# Patient Record
Sex: FEMALE | Race: BLACK OR AFRICAN AMERICAN | Employment: FULL TIME | URBAN - METROPOLITAN AREA
[De-identification: names, ages, dates, MRNs, and addresses within clinical notes are randomized per-mention and may not be internally consistent; named-entity substitution may affect disease eponyms.]

---

## 2017-08-25 ENCOUNTER — HOSPITAL ENCOUNTER (EMERGENCY)
Age: 27
Discharge: HOME | End: 2017-08-25
Attending: FAMILY MEDICINE

## 2017-08-25 VITALS
SYSTOLIC BLOOD PRESSURE: 139 MMHG | OXYGEN SATURATION: 97 % | HEIGHT: 66 IN | DIASTOLIC BLOOD PRESSURE: 82 MMHG | WEIGHT: 290.1 LBS | BODY MASS INDEX: 46.62 KG/M2 | HEART RATE: 79 BPM | RESPIRATION RATE: 20 BRPM | TEMPERATURE: 98.7 F

## 2017-08-25 DIAGNOSIS — L91.0 KELOID OF SKIN: ICD-10-CM

## 2017-08-25 DIAGNOSIS — L05.91 PILONIDAL CYST: Primary | ICD-10-CM

## 2017-08-25 RX ORDER — AMOXICILLIN AND CLAVULANATE POTASSIUM 875; 125 MG/1; MG/1
1 TABLET, FILM COATED ORAL EVERY 12 HOURS
Qty: 20 TAB | Refills: 0 | Status: SHIPPED | OUTPATIENT
Start: 2017-08-25 | End: 2017-09-04

## 2017-08-25 NOTE — UC PROVIDER NOTE
Patient is a 33 y.o. female presenting with skin problem. The history is provided by the patient. Skin Problem    This is a chronic problem. Episode onset: 5 months ago noticed swelling on buttocks with slight discomfort and mild bloody drainage; has hx of pilonidal cyst removal in the past. The problem has not changed since onset. The problem is associated with an unknown factor. There has been no fever. The rash is present on the back. The pain is at a severity of 2/10. The pain has been constant since onset. She has tried nothing for the symptoms. History reviewed. No pertinent past medical history. History reviewed. No pertinent surgical history. History reviewed. No pertinent family history. Social History     Social History    Marital status: SINGLE     Spouse name: N/A    Number of children: N/A    Years of education: N/A     Occupational History    Not on file. Social History Main Topics    Smoking status: Current Every Day Smoker    Smokeless tobacco: Current User    Alcohol use Not on file    Drug use: Not on file    Sexual activity: Not on file     Other Topics Concern    Not on file     Social History Narrative    No narrative on file                ALLERGIES: Review of patient's allergies indicates no known allergies. Review of Systems   Constitutional: Negative for chills and fever. Respiratory: Negative for shortness of breath and wheezing. Cardiovascular: Negative for chest pain and palpitations. Gastrointestinal: Negative for nausea and vomiting. Musculoskeletal: Negative for myalgias. Neurological: Negative for dizziness and headaches. Vitals:    08/25/17 1521   BP: 139/82   Pulse: 79   Resp: 20   Temp: 98.7 °F (37.1 °C)   SpO2: 97%   Weight: 131.6 kg (290 lb 1.6 oz)   Height: 5' 6\" (1.676 m)       Physical Exam   Constitutional: She appears well-developed and well-nourished. No distress. Neurological: She is alert.    Skin: She is not diaphoretic. Proximal crease of buttocks: 2 keloid like masses with no TTP and slight bloody drainage from proximal lesion; no fluctuance; scarring notes   Psychiatric: She has a normal mood and affect. Her behavior is normal. Judgment and thought content normal.   Nursing note and vitals reviewed. MDM     Differential Diagnosis; Clinical Impression; Plan:     CLINICAL IMPRESSION:  Pilonidal cyst  (primary encounter diagnosis)  Keloid of skin    Plan:  1. Augmentin  2. F/u with surgeon  Risk of Significant Complications, Morbidity, and/or Mortality:   Presenting problems: Moderate  Management options:   Moderate  Progress:   Patient progress:  Stable      Procedures

## 2017-08-25 NOTE — LETTER
[facility] 
[address] [place] [address] 
[phone] Work/School Note Date: 8/25/2017 To Whom It May concern: 
 
Tony Ruiz was seen and treated today in the urgent care center by the following provider(s): 
No providers found. Sincere Harrell {Return to school/sport/work:8/26/17 Sincerely, Debby Clement RN

## 2017-08-25 NOTE — DISCHARGE INSTRUCTIONS
Learning About Pilonidal Disease  What is pilonidal disease? Pilonidal (say \"kw-pfj-PY-dul\") disease is a long-term skin infection. The infection develops in a cyst at the top of or next to the crease between the buttocks. The cyst may look like a small dimple, which is called a \"pit\" or \"sinus. \" Hair may grow from the pit, and you may have several pits. What are the symptoms? · You may have no symptoms. · If the cyst is infected, you may:  ¨ Have redness or swelling in the area. ¨ Have cloudy fluid or blood draining from the cyst.  ¨ Find it hard to walk or sit because of pain. ¨ Have a fever. This is not common. How can you prevent infection? You may be able to prevent infection and symptoms. · Keep the area clean and dry. · Avoid sitting on hard surfaces for long periods of time. How is pilonidal disease treated? If you have a pilonidal cyst that is not causing symptoms, you don't need medical treatment. If a pilonidal cyst is infected:  · You will probably get antibiotics. If your doctor prescribes antibiotics, take them as directed. Do not stop taking them just because you feel better. You need to take the full course of antibiotics. · Soak in a warm tub several times a day. · Ask your doctor if you can take an over-the-counter pain medicine, such as acetaminophen (Tylenol), ibuprofen (Advil, Motrin), or naproxen (Aleve). Read and follow all instructions on the label. · You may need to have the cyst cut open and drained or removed. Follow-up care is a key part of your treatment and safety. Be sure to make and go to all appointments, and call your doctor if you are having problems. It's also a good idea to know your test results and keep a list of the medicines you take. Where can you learn more? Go to http://jenny-maría.info/. Enter C612 in the search box to learn more about \"Learning About Pilonidal Disease. \"  Current as of: October 13, 2016  Content Version: 11.3  © 2502-8270 Healthwise, Incorporated. Care instructions adapted under license by [employer] (which disclaims liability or warranty for this information). If you have questions about a medical condition or this instruction, always ask your healthcare professional. [address] any warranty or liability for your use of this information.

## 2017-08-30 ENCOUNTER — OFFICE VISIT (OUTPATIENT)
Dept: SURGERY | Age: 27
End: 2017-08-30

## 2017-08-30 VITALS
RESPIRATION RATE: 20 BRPM | WEIGHT: 291.6 LBS | DIASTOLIC BLOOD PRESSURE: 91 MMHG | SYSTOLIC BLOOD PRESSURE: 130 MMHG | HEART RATE: 82 BPM | BODY MASS INDEX: 46.86 KG/M2 | HEIGHT: 66 IN | OXYGEN SATURATION: 99 %

## 2017-08-30 DIAGNOSIS — L05.01 PILONIDAL CYST WITH ABSCESS: Primary | ICD-10-CM

## 2017-08-30 RX ORDER — LIDOCAINE HYDROCHLORIDE AND EPINEPHRINE 20; 10 MG/ML; UG/ML
10 INJECTION, SOLUTION INFILTRATION; PERINEURAL ONCE
Qty: 10 ML | Refills: 0
Start: 2017-08-30 | End: 2017-08-30

## 2017-08-30 NOTE — PROGRESS NOTES
[facility]  OFFICE PROCEDURE PROGRESS NOTE        Chart reviewed for the following:   Arleth Lebron LPN, have reviewed the History, Physical and updated the Allergic reactions for [address] performed immediately prior to start of procedure:   Arleth Lebron LPN, have performed the following reviews on [address] prior to the start of the procedure:            * Patient was identified by name and date of birth   * Agreement on procedure being performed was verified  * Risks and Benefits explained to the patient  * Procedure site verified and marked as necessary  * Patient was positioned for comfort  * Consent was signed and verified     Time: 10:45      Date of procedure: 8/30/2017    Procedure performed by:  Mario Mejía MD    Provider assisted by: Padmini Montiel LPN    Patient assisted by: self    How tolerated by patient: tolerated the procedure well with no complications    Post Procedural Pain Scale: 0 - No Hurt    Comments: none

## 2017-08-30 NOTE — LETTER
NOTIFICATION OF RETURN TO WORK 
 
8/30/2017 11:20 AM 
 
Ms. ADELA Hunter [address] [place] [address] Jess Fuentes To Whom It May Concern: 
 
ADELA Hunter is under the care of [address] starting August 30, 2017. She will be unable to return to work today but can return to work on Thursday, August 31, 2017 with no restrictions. If there are questions or concerns please have the patient contact our office. Sincerely, Mario Mejía MD

## 2017-08-30 NOTE — PROGRESS NOTES
The patient is a 33 y.o. woman who reports that when she was in high school she had surgery in the region of the prenatal cleft and thinks she may have had pilonidal cyst surgery. She reports that she has had several episodes where the site becomes painful and drains. The last was around six months ago. She then had an episode starting about four days ago. The patient reports that she is taking Augmentin prescribed for this condition. Past medical history includes asthma and sleep apnea. The patient does currently smoke. The patient occasionally uses alcohol. The patient has no history of heart disease or anginal chest pain. She has no history of diabetes. On examination in the prenatal cleft there are two areas of hypertrophic scar each about 1 cm across. There is an area which feels fluctuant immediately to the left of midline in that area. These two areas of hypertrophic scar are in the mid line. As this appeared to be an infected pilonidal cyst, I recommended incision and drainage under local anesthesia. Risks versus benefits were reviewed with the patient. The patient understood that this would consist today of incision and drainage only and the cyst once it was no longer infected, be considered for excision. Risks would include bleeding, infection, nonhealing, recurrence of infection, etc.  The patient appears to have a recurrent pilonidal cyst with infection. The patient did wish to proceed. The site was marked. Standard timeout was performed. The site was sterilely prepared and draped. Local anesthesia Xylocaine with 2% epinephrine was injected into the skin in the area to a total of about 5 cc. A longitudinal incision was made just to the left of midline at the fluctuant area and the cavity was entered which had some bloody pus. The cavity depth appeared to be between 2 - 2.5 cm and width around 2 cm.   The cavity was packed open with 1/4-inch Iodoform and covered by dry gauze. The patient will see me in follow up in 5 days. She is to change the outer dressing daily and ideally keep the packing in place. It is okay to shower. She should continue her antibiotics. I strongly urged the patient to stop smoking completely. Aids in quitting smoking were discussed.     Final Diagnosis:  Infected pilonidal cyst.    Cornel/narciso

## 2017-08-30 NOTE — MR AVS SNAPSHOT
Visit Information Date & Time Provider Department Dept. Phone Encounter #  
 8/30/2017  9:20 AM Casper Dowd MD [facility]  [address] [phone] [id number] Upcoming Health Maintenance Date Due  
 HPV AGE 9Y-34Y (1 of 3 - Female 3 Dose Series) 6/23/2002 Pneumococcal 19-64 Medium Risk (1 of 1 - PPSV23) 6/23/2010 DTaP/Tdap/Td series (1 - Tdap) 6/23/2012 PAP AKA CERVICAL CYTOLOGY 6/23/2012 INFLUENZA AGE 9 TO ADULT 8/1/2017 Allergies as of 8/30/2017  Review Complete On: 8/30/2017 By: Cecille Cabot No Known Allergies Current Immunizations  Never Reviewed No immunizations on file. Not reviewed this visit Vitals BP Pulse Resp Height(growth percentile) Weight(growth percentile) LMP  
 (!) 130/91 (BP 1 Location: Right arm, BP Patient Position: Sitting) 82 20 5' 6\" (1.676 m) 291 lb 9.6 oz (132.3 kg) 08/20/2017 SpO2 BMI OB Status Smoking Status 99% 47.07 kg/m2 Having regular periods Current Every Day Smoker BMI and BSA Data Body Mass Index Body Surface Area 47.07 kg/m 2 2.48 m 2 Your Updated Medication List  
  
   
This list is accurate as of: 8/30/17 11:07 AM.  Always use your most recent med list.  
  
  
  
  
 amoxicillin-clavulanate 875-125 mg per tablet Commonly known as:  AUGMENTIN Take 1 Tab by mouth every twelve (12) hours for 10 days. Introducing [facility] & HEALTH SERVICES! Lanette Morales introduces [employer] patient portal. Now you can access parts of your medical record, email your doctor's office, and request medication refills online. 1. In your internet browser, go to [email or web address] 2. Click on the First Time User? Click Here link in the Sign In box. You will see the New Member Sign Up page. 3. Enter your [employer]Joy Access Code exactly as it appears below. You will not need to use this code after youve completed the sign-up process.  If you do not sign up before the expiration date, you must request a new code. · LAVEGO Access Code: [email or web address] Expires: 11/23/2017  3:36 PM 
 
4. Enter the last four digits of your Social Security Number (xxxx) and Date of Birth (mm/dd/yyyy) as indicated and click Submit. You will be taken to the next sign-up page. 5. Create a LAVEGO ID. This will be your LAVEGO login ID and cannot be changed, so think of one that is secure and easy to remember. 6. Create a LAVEGO password. You can change your password at any time. 7. Enter your Password Reset Question and Answer. This can be used at a later time if you forget your password. 8. Enter your e-mail address. You will receive e-mail notification when new information is available in [address] 9. Click Sign Up. You can now view and download portions of your medical record. 10. Click the Download Summary menu link to download a portable copy of your medical information. If you have questions, please visit the Frequently Asked Questions section of the LAVEGO website. Remember, LAVEGO is NOT to be used for urgent needs. For medical emergencies, dial 911. Now available from your iPhone and Android! Please provide this summary of care documentation to your next provider. Your primary care clinician is listed as NONE. If you have any questions after today's visit, please call [phone].

## 2017-08-31 PROBLEM — L05.01 PILONIDAL CYST WITH ABSCESS: Status: ACTIVE | Noted: 2017-08-31

## 2017-09-07 ENCOUNTER — OFFICE VISIT (OUTPATIENT)
Dept: SURGERY | Age: 27
End: 2017-09-07

## 2017-09-07 VITALS
HEIGHT: 66 IN | OXYGEN SATURATION: 98 % | DIASTOLIC BLOOD PRESSURE: 77 MMHG | SYSTOLIC BLOOD PRESSURE: 112 MMHG | BODY MASS INDEX: 46.93 KG/M2 | HEART RATE: 92 BPM | WEIGHT: 292 LBS

## 2017-09-07 DIAGNOSIS — Z09 POSTOPERATIVE EXAMINATION: Primary | ICD-10-CM

## 2017-09-07 NOTE — MR AVS SNAPSHOT
Visit Information Date & Time Provider Department Dept. Phone Encounter #  
 9/7/2017 10:40 AM Candice Castillo MD [facility] [phone] [id number] Upcoming Health Maintenance Date Due  
 HPV AGE 9Y-34Y (1 of 3 - Female 3 Dose Series) 6/23/2002 Pneumococcal 19-64 Medium Risk (1 of 1 - PPSV23) 6/23/2010 DTaP/Tdap/Td series (1 - Tdap) 6/23/2012 PAP AKA CERVICAL CYTOLOGY 6/23/2012 INFLUENZA AGE 9 TO ADULT 8/1/2017 Allergies as of 9/7/2017  Review Complete On: 9/7/2017 By: Candice Castillo MD  
 No Known Allergies Current Immunizations  Never Reviewed No immunizations on file. Not reviewed this visit You Were Diagnosed With   
  
 Codes Comments Postoperative examination    -  Primary ICD-10-CM: O27 ICD-9-CM: V67.00 Vitals BP Pulse Height(growth percentile) Weight(growth percentile) LMP SpO2  
 112/77 92 5' 6\" (1.676 m) 292 lb (132.5 kg) 08/20/2017 98% BMI OB Status Smoking Status 47.13 kg/m2 Having regular periods Current Every Day Smoker BMI and BSA Data Body Mass Index Body Surface Area  
 47.13 kg/m 2 2.48 m 2 Your Updated Medication List  
  
Notice  As of 9/7/2017 12:13 PM  
 You have not been prescribed any medications. Introducing [facility] & HEALTH SERVICES! Marlen Walker introduces [employer] patient portal. Now you can access parts of your medical record, email your doctor's office, and request medication refills online. 1. In your internet browser, go to [email or web address] 2. Click on the First Time User? Click Here link in the Sign In box. You will see the New Member Sign Up page. 3. Enter your [employer] Access Code exactly as it appears below. You will not need to use this code after youve completed the sign-up process. If you do not sign up before the expiration date, you must request a new code.  
 
· [employer] Access Code: [email or web address] 
 Expires: 11/23/2017  3:36 PM 
 
4. Enter the last four digits of your Social Security Number (xxxx) and Date of Birth (mm/dd/yyyy) as indicated and click Submit. You will be taken to the next sign-up page. 5. Create a PostBeyond ID. This will be your PostBeyond login ID and cannot be changed, so think of one that is secure and easy to remember. 6. Create a PostBeyond password. You can change your password at any time. 7. Enter your Password Reset Question and Answer. This can be used at a later time if you forget your password. 8. Enter your e-mail address. You will receive e-mail notification when new information is available in [address] 9. Click Sign Up. You can now view and download portions of your medical record. 10. Click the Download Summary menu link to download a portable copy of your medical information. If you have questions, please visit the Frequently Asked Questions section of the PostBeyond website. Remember, PostBeyond is NOT to be used for urgent needs. For medical emergencies, dial 911. Now available from your iPhone and Android! Please provide this summary of care documentation to your next provider. Your primary care clinician is listed as NONE. If you have any questions after today's visit, please call [phone].

## 2017-09-07 NOTE — PROGRESS NOTES
The patient is seen in follow up after incision and drainage of infected pilonidal cyst. The patient reports the area of pilonidal feels much better. She did have one area further down in the buttock which became painful but is now subsiding and the second area on the inner aspect of the proximal left thigh. On examination the I & D site of the pilonidal appears quiet. Packing had come out according to the patient a few days ago. Further down mid left buttock, there is a shallow ulcerated area about 1/2 cm across with granulation. There is no overt fluctuance. Medical aspect proximal left thigh is similar appearing. The buttock and left thigh sites to some extent have an appearance typical of hidradenitis. By history the process appears to be improving. With respect to the pilonidal site, I offered to the patient consideration of elective excision of the pilonidal once the infectious process had fully resolved. I have reviewed at her last visit typical operative and post operative course for the surgery. The biggest risk for pilonidal cyst surgery is risk of problems with healing which can be quite prolonged. The patient will think about all of this and decide if she wants to plan for pilonidal cyst surgery. The patient does not presently have a primary care physician. The patient was given contact information for Dr. Ortega Adjutant office.      The patient will call our office if she wants to make plans for excision of pilonidal cyst.    Cornel/narciso

## 2017-10-30 ENCOUNTER — OFFICE VISIT (OUTPATIENT)
Dept: SURGERY | Age: 27
End: 2017-10-30

## 2017-10-30 VITALS
WEIGHT: 293 LBS | SYSTOLIC BLOOD PRESSURE: 134 MMHG | RESPIRATION RATE: 24 BRPM | TEMPERATURE: 99 F | HEART RATE: 82 BPM | DIASTOLIC BLOOD PRESSURE: 95 MMHG | OXYGEN SATURATION: 98 % | HEIGHT: 67 IN | BODY MASS INDEX: 45.99 KG/M2

## 2017-10-30 DIAGNOSIS — L05.91 CHRONIC RECURRENT PILONIDAL CYST WITHOUT ABSCESS: Primary | ICD-10-CM

## 2017-10-30 RX ORDER — DOXYCYCLINE 100 MG/1
100 TABLET ORAL 2 TIMES DAILY
Qty: 20 TAB | Refills: 0 | Status: SHIPPED | OUTPATIENT
Start: 2017-10-30 | End: 2017-11-09

## 2017-10-30 NOTE — PROGRESS NOTES
The patient is seen with report of intermittent drainage from the pilonidal site. The other two sites on the abscess have resolved. She does not have much pain in the area of the pilonidal but has to keep a bandage there because of intermittent drainage. On examination there are two sinus sites in the midline at the pilonidal scar. The patient has a chronically draining pilonidal cyst. I have recommended surgical excision. This is a recurrence of a pilonidal cyst that had previously been excised. Risks versus benefits were reviewed with the patient. Risks would include bleeding, failure to resolve infection, recurrence of pilonidal cyst, difficulty with wound healing, risks of general anesthesia. The patient understands but wants to wait until December for her surgery. I recommended that the patient proceed as soon as would be practical with the surgery as I think this site likely will continue to chronically drain. I will write for a 10-day course of oral Doxycycline 100 mg po bid to try to reduce the inflammation in the area but ultimately I think the cyst will need excision.      Final Diagnosis:  Draining pilonidal cyst.     Cornel/narciso

## 2017-10-30 NOTE — MR AVS SNAPSHOT
Visit Information Date & Time Provider Department Dept. Phone Encounter #  
 10/30/2017  9:40 AM Gina Mercado MD [facility] [address] [phone] [id number] Upcoming Health Maintenance Date Due  
 HPV AGE 9Y-34Y (1 of 3 - Female 3 Dose Series) 6/23/2002 Pneumococcal 19-64 Medium Risk (1 of 1 - PPSV23) 6/23/2010 DTaP/Tdap/Td series (1 - Tdap) 6/23/2012 PAP AKA CERVICAL CYTOLOGY 6/23/2012 INFLUENZA AGE 9 TO ADULT 8/1/2017 Allergies as of 10/30/2017  Review Complete On: 10/30/2017 By: Gina Mercado MD  
 No Known Allergies Current Immunizations  Never Reviewed No immunizations on file. Not reviewed this visit You Were Diagnosed With   
  
 Codes Comments Chronic recurrent pilonidal cyst without abscess    -  Primary ICD-10-CM: L05.91 
ICD-9-CM: 689. 1 Vitals BP Pulse Temp Resp Height(growth percentile) Weight(growth percentile) (!) 134/95 82 99 °F (37.2 °C) (Oral) 24 5' 7\" (1.702 m) 294 lb (133.4 kg) LMP SpO2 BMI OB Status Smoking Status (Exact Date) 98% 46.05 kg/m2 Having regular periods Current Every Day Smoker BMI and BSA Data Body Mass Index Body Surface Area 46.05 kg/m 2 2.51 m 2 Preferred Pharmacy Pharmacy Name Phone [address] AT [address] [phone] Your Updated Medication List  
  
   
This list is accurate as of: 10/30/17 12:26 PM.  Always use your most recent med list.  
  
  
  
  
 doxycycline 100 mg tablet Commonly known as:  ADOXA Take 1 Tab by mouth two (2) times a day for 10 days. Prescriptions Sent to Pharmacy Refills  
 doxycycline (ADOXA) 100 mg tablet 0 Sig: Take 1 Tab by mouth two (2) times a day for 10 days.   
 Class: Normal  
 Pharmacy: [facility] [address] [place] [address] #: [phone] Route: Oral  
  
Introducing [facility] & HEALTH SERVICES! Jacob Wilcox introduces [employer] patient portal. Now you can access parts of your medical record, email your doctor's office, and request medication refills online. 1. In your internet browser, go to [email or web address] 2. Click on the First Time User? Click Here link in the Sign In box. You will see the New Member Sign Up page. 3. Enter your [employer] Access Code exactly as it appears below. You will not need to use this code after youve completed the sign-up process. If you do not sign up before the expiration date, you must request a new code. · [employer] Access Code: [email or web address] Expires: 11/23/2017  3:36 PM 
 
4. Enter the last four digits of your Social Security Number (xxxx) and Date of Birth (mm/dd/yyyy) as indicated and click Submit. You will be taken to the next sign-up page. 5. Create a [employer] ID. This will be your [employer] login ID and cannot be changed, so think of one that is secure and easy to remember. 6. Create a [employer] password. You can change your password at any time. 7. Enter your Password Reset Question and Answer. This can be used at a later time if you forget your password. 8. Enter your e-mail address. You will receive e-mail notification when new information is available in [address] 9. Click Sign Up. You can now view and download portions of your medical record. 10. Click the Download Summary menu link to download a portable copy of your medical information. If you have questions, please visit the Frequently Asked Questions section of the [employer] website. Remember, [employer] is NOT to be used for urgent needs. For medical emergencies, dial 911. Now available from your iPhone and Android! Please provide this summary of care documentation to your next provider. Your primary care clinician is listed as NONE. If you have any questions after today's visit, please call [phone].

## 2018-07-23 ENCOUNTER — APPOINTMENT (OUTPATIENT)
Dept: GENERAL RADIOLOGY | Age: 28
End: 2018-07-23
Attending: PHYSICIAN ASSISTANT
Payer: COMMERCIAL

## 2018-07-23 ENCOUNTER — HOSPITAL ENCOUNTER (EMERGENCY)
Age: 28
Discharge: HOME | End: 2018-07-23
Attending: EMERGENCY MEDICINE
Payer: COMMERCIAL

## 2018-07-23 VITALS
WEIGHT: 293 LBS | HEIGHT: 66 IN | OXYGEN SATURATION: 95 % | BODY MASS INDEX: 47.09 KG/M2 | HEART RATE: 81 BPM | RESPIRATION RATE: 16 BRPM | DIASTOLIC BLOOD PRESSURE: 84 MMHG | TEMPERATURE: 97.8 F | SYSTOLIC BLOOD PRESSURE: 129 MMHG

## 2018-07-23 DIAGNOSIS — S61.411A LACERATION OF MULTIPLE SITES OF RIGHT HAND AND WRIST, INITIAL ENCOUNTER: Primary | ICD-10-CM

## 2018-07-23 DIAGNOSIS — S61.511A LACERATION OF MULTIPLE SITES OF RIGHT HAND AND WRIST, INITIAL ENCOUNTER: Primary | ICD-10-CM

## 2018-07-23 PROCEDURE — 74011250636 HC RX REV CODE- 250/636: Performed by: PHYSICIAN ASSISTANT

## 2018-07-23 PROCEDURE — 99283 EMERGENCY DEPT VISIT LOW MDM: CPT

## 2018-07-23 PROCEDURE — 77030018836 HC SOL IRR NACL ICUM -A

## 2018-07-23 PROCEDURE — 77030002916 HC SUT ETHLN J&J -A

## 2018-07-23 PROCEDURE — 90471 IMMUNIZATION ADMIN: CPT

## 2018-07-23 PROCEDURE — 74011000250 HC RX REV CODE- 250: Performed by: PHYSICIAN ASSISTANT

## 2018-07-23 PROCEDURE — 75810000293 HC SIMP/SUPERF WND  RPR

## 2018-07-23 PROCEDURE — 90715 TDAP VACCINE 7 YRS/> IM: CPT | Performed by: PHYSICIAN ASSISTANT

## 2018-07-23 RX ORDER — LIDOCAINE HYDROCHLORIDE 20 MG/ML
10 INJECTION, SOLUTION INFILTRATION; PERINEURAL
Status: COMPLETED | OUTPATIENT
Start: 2018-07-23 | End: 2018-07-23

## 2018-07-23 RX ORDER — BACITRACIN 500 UNIT/G
1 PACKET (EA) TOPICAL
Status: COMPLETED | OUTPATIENT
Start: 2018-07-23 | End: 2018-07-23

## 2018-07-23 RX ORDER — LIDOCAINE HYDROCHLORIDE 20 MG/ML
10 INJECTION, SOLUTION INFILTRATION; PERINEURAL ONCE
Status: COMPLETED | OUTPATIENT
Start: 2018-07-23 | End: 2018-07-23

## 2018-07-23 RX ADMIN — LIDOCAINE HYDROCHLORIDE 200 MG: 20 INJECTION, SOLUTION INFILTRATION; PERINEURAL at 03:26

## 2018-07-23 RX ADMIN — LIDOCAINE HYDROCHLORIDE 200 MG: 20 INJECTION, SOLUTION INFILTRATION; PERINEURAL at 03:15

## 2018-07-23 RX ADMIN — TETANUS TOXOID, REDUCED DIPHTHERIA TOXOID AND ACELLULAR PERTUSSIS VACCINE, ADSORBED 0.5 ML: 5; 2.5; 8; 8; 2.5 SUSPENSION INTRAMUSCULAR at 03:15

## 2018-07-23 RX ADMIN — BACITRACIN 1 PACKET: 500 OINTMENT TOPICAL at 03:15
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
## 2018-07-23 NOTE — DISCHARGE INSTRUCTIONS

## 2018-07-23 NOTE — ED PROVIDER NOTES
HPI Comments: 34 yo F with of asthma and sleep apnea here for evaluation of right arm lacerations. States [place] the club and it cut on the door. Bleeding controlled; full ROM. No additional complaints. Patient is a 33 y.o. female presenting with skin laceration. The history is provided by the patient. Laceration    The pain is at a severity of 7/10. The pain is moderate. The pain has been constant since onset. Pertinent negatives include no numbness and no loss of motion. It is unknown when the patient last had a tetanus shot. Past Medical History:   Diagnosis Date    Asthma     Sleep apnea        Past Surgical History:   Procedure Laterality Date    HX OTHER SURGICAL  2007-08? cyst lanced         Family History:   Problem Relation Age of Onset    Hypertension Mother     Hypertension Father     Diabetes Father     Cancer Father      myeloma? Social History     Social History    Marital status: SINGLE     Spouse name: N/A    Number of children: N/A    Years of education: N/A     Occupational History    Not on file. Social History Main Topics    Smoking status: Current Every Day Smoker     Packs/day: 0.50     Years: 10.00    Smokeless tobacco: Current User    Alcohol use Yes      Comment: weekends    Drug use: Not on file    Sexual activity: Not on file     Other Topics Concern    Not on file     Social History Narrative         ALLERGIES: Review of patient's allergies indicates no known allergies. Review of Systems   Constitutional: Negative for activity change and fever. HENT: Negative for facial swelling. Eyes: Negative for discharge. Respiratory: Negative for cough. Cardiovascular: Negative for leg swelling. Gastrointestinal: Negative for abdominal distention. Skin: Positive for wound. Negative for color change. Neurological: Negative for seizures, syncope and numbness. Psychiatric/Behavioral: Negative for behavioral problems.        Vitals: 07/23/18 0241   BP: 128/85   Pulse: 84   Resp: 15   Temp: 98.1 °F (36.7 °C)   SpO2: 97%   Weight: 138.6 kg (305 lb 9.6 oz)   Height: 5' 5.5\" (1.664 m)            Physical Exam   Constitutional: She is oriented to person, place, and time. She appears well-nourished. +ETOH; cooperative    HENT:   Head: Normocephalic and atraumatic. Eyes: Pupils are equal, round, and reactive to light. Neck: Normal range of motion. Cardiovascular: Normal rate and regular rhythm. Pulses:       Radial pulses are 2+ on the right side   Pulmonary/Chest: Effort normal and breath sounds normal.   Abdominal: Soft. There is no tenderness. Musculoskeletal: Normal range of motion. She exhibits no edema or tenderness. Hands:  Neurological: She is alert and oriented to person, place, and time. Skin: Skin is warm and dry. Psychiatric: She has a normal mood and affect. Nursing note and vitals reviewed. MDM  Number of Diagnoses or Management Options  Laceration of multiple sites of right hand and wrist, initial encounter:      Amount and/or Complexity of Data Reviewed  Obtain history from someone other than the patient: yes  Discuss the patient with other providers: yes          ED Course       Wound Repair #1 (hand)  Date/Time: 7/23/2018 3:10 AM  Performed by: PAPreparation: skin prepped with Betadine, skin prepped with Shur-Clens and sterile field established  Pre-procedure re-eval: Immediately prior to the procedure, the patient was reevaluated and found suitable for the planned procedure and any planned medications.   Location details: right hand  Wound length:2.6 - 7.5 cm    Anesthesia:  Local Anesthetic: lidocaine 2% without epinephrine  Anesthetic total: 6 mL  Foreign bodies: no foreign bodies  Irrigation solution: saline  Irrigation method: syringe  Debridement: none  Skin closure: 4-0 nylon  Number of sutures: 6  Technique: simple and interrupted  Approximation: close  Dressing: antibiotic ointment and pressure dressing  Patient tolerance: Patient tolerated the procedure well with no immediate complications  My total time at bedside, performing this procedure was 1-15 minutes. Comments: Scrubbed with chlorhexidine scrub. Sheryle Maker, PA    Wound Repair#2 right wrist  Date/Time: 7/23/2018 3:20 AM  Performed by: PAPreparation: skin prepped with Betadine, skin prepped with Shur-Clens and sterile field established  Location details: right wrist  Wound length:2.5 cm or less    Anesthesia:  Local Anesthetic: lidocaine 2% without epinephrine  Anesthetic total: 4 mL  Foreign bodies: no foreign bodies  Irrigation solution: saline  Irrigation method: syringe  Debridement: minimal  Skin closure: 4-0 nylon  Number of sutures: 3  Technique: simple and interrupted  Approximation: close  Dressing: antibiotic ointment and pressure dressing  Patient tolerance: Patient tolerated the procedure well with no immediate complications  My total time at bedside, performing this procedure was 1-15 minutes. Wound Repair #3 right forearm  Date/Time: 7/23/2018 3:35 AM  Performed by: PAPreparation: skin prepped with Betadine, skin prepped with Shur-Clens and sterile field established  Pre-procedure re-eval: Immediately prior to the procedure, the patient was reevaluated and found suitable for the planned procedure and any planned medications. Location: Right forearm.   Wound length:2.5 cm or less  Anesthesia: local infiltration    Anesthesia:  Local Anesthetic: lidocaine 2% without epinephrine  Anesthetic total: 5 mL  Foreign bodies: no foreign bodies  Irrigation solution: saline  Irrigation method: syringe  Debridement: none  Skin closure: 4-0 nylon  Number of sutures: 2  Technique: simple and interrupted  Approximation: close  Dressing: antibiotic ointment and pressure dressing  Patient tolerance: Patient tolerated the procedure well with no immediate complications  My total time at bedside, performing this procedure was 1-15 minutes. Pt declines xrays; no FB noted on exam. Pt aware there could be FB. Patient has been reassessed. Feeling better. Ready to discharge home. Discussed case with attending Physician. Agrees with care and will D/C with follow up.      3:50 AM  Patient's results have been reviewed with them. Patient and/or family have verbally conveyed their understanding and agreement of the patient's signs, symptoms, diagnosis, treatment and prognosis and additionally agree to follow up as recommended or return to the Emergency Room should their condition change prior to follow-up. Discharge instructions have also been provided to the patient with some educational information regarding their diagnosis as well a list of reasons why they would want to return to the ER prior to their follow-up appointment should their condition change.   HOLLEY King

## 2018-07-23 NOTE — ED NOTES
Discharge instructions given to patient by PA and nurse. Pt to monitor for s/s of infection. Pt  verbalizes understanding. Pt to follow up with PCP for suture removal and follow up.

## 2021-10-23 ENCOUNTER — HOSPITAL ENCOUNTER (EMERGENCY)
Age: 31
Discharge: HOME | End: 2021-10-23
Attending: EMERGENCY MEDICINE | Admitting: EMERGENCY MEDICINE
Payer: COMMERCIAL

## 2021-10-23 DIAGNOSIS — L60.0 INGROWING LEFT GREAT TOENAIL: Primary | ICD-10-CM

## 2021-10-23 PROCEDURE — 90471 IMMUNIZATION ADMIN: CPT

## 2021-10-23 PROCEDURE — 99283 EMERGENCY DEPT VISIT LOW MDM: CPT

## 2021-10-23 PROCEDURE — 74011250636 HC RX REV CODE- 250/636: Performed by: EMERGENCY MEDICINE

## 2021-10-23 PROCEDURE — 74011250637 HC RX REV CODE- 250/637: Performed by: EMERGENCY MEDICINE

## 2021-10-23 PROCEDURE — 90715 TDAP VACCINE 7 YRS/> IM: CPT | Performed by: EMERGENCY MEDICINE

## 2021-10-23 RX ORDER — CEPHALEXIN 250 MG/1
1000 CAPSULE ORAL
Status: COMPLETED | OUTPATIENT
Start: 2021-10-23 | End: 2021-10-23

## 2021-10-23 RX ORDER — MUPIROCIN CALCIUM 20 MG/G
CREAM TOPICAL 2 TIMES DAILY
Qty: 15 G | Refills: 0 | Status: SHIPPED | OUTPATIENT
Start: 2021-10-23

## 2021-10-23 RX ORDER — CEPHALEXIN 500 MG/1
500 CAPSULE ORAL 4 TIMES DAILY
Qty: 28 CAPSULE | Refills: 0 | Status: SHIPPED | OUTPATIENT
Start: 2021-10-23 | End: 2021-10-30

## 2021-10-23 RX ORDER — TRAMADOL HYDROCHLORIDE 50 MG/1
50 TABLET ORAL
Qty: 15 TABLET | Refills: 0 | Status: SHIPPED | OUTPATIENT
Start: 2021-10-23 | End: 2021-10-26

## 2021-10-23 RX ORDER — ALBUTEROL SULFATE 90 UG/1
2 AEROSOL, METERED RESPIRATORY (INHALATION)
COMMUNITY

## 2021-10-23 RX ADMIN — CEPHALEXIN 1000 MG: 250 CAPSULE ORAL at 16:27

## 2021-10-23 RX ADMIN — TETANUS TOXOID, REDUCED DIPHTHERIA TOXOID AND ACELLULAR PERTUSSIS VACCINE, ADSORBED 0.5 ML: 5; 2.5; 8; 8; 2.5 SUSPENSION INTRAMUSCULAR at 16:27

## 2021-10-23 NOTE — ED NOTES
Reviewed d/c instructions with the patient, who verbalizes understanding of the instructions given. Pt alert with respirations even and unlabored and gait steady.

## 2021-10-23 NOTE — DISCHARGE INSTRUCTIONS
Soak in warm water and baking soda 2-3 times a day as discussed. Use only unscented soaps and lotions on your skin. FOLLOW UP IF ANY INCREASE IN REDNESS OR STREAKING OF REDNESS AS DISCUSSED. FINISH ANTIBIOTICS AS PRESCRIBED.     Follow-up with foot specialist.

## 2021-10-23 NOTE — ED PROVIDER NOTES
HPI patient is a 28-year-old female with past medical history significant for asthma, hidradenitis, sleep apnea and morbid obesity who presents to the ED with left great toe nail area pain for several weeks. She states that she has been using a tweezers to try and pull the nail edge away and continues to feel very irritated and swollen. She denies any direct injury or blunt trauma. There has been no discharge or bleeding. Pain increases with palpation. She has not had any medications today prior to arrival.    Past Medical History:   Diagnosis Date    Asthma     Hydradenitis     Sleep apnea        Past Surgical History:   Procedure Laterality Date    HX OTHER SURGICAL  2007-08? cyst lanced         Family History:   Problem Relation Age of Onset    Hypertension Mother     Hypertension Father     Diabetes Father     Cancer Father         myeloma? Social History     Socioeconomic History    Marital status: SINGLE     Spouse name: Not on file    Number of children: Not on file    Years of education: Not on file    Highest education level: Not on file   Occupational History    Not on file   Tobacco Use    Smoking status: Current Every Day Smoker     Packs/day: 0.50     Years: 10.00     Pack years: 5.00    Smokeless tobacco: Current User   Substance and Sexual Activity    Alcohol use: Yes     Comment: weekends    Drug use: Never    Sexual activity: Not on file   Other Topics Concern    Not on file   Social History Narrative    Not on file     Social Determinants of Health     Financial Resource Strain:     Difficulty of Paying Living Expenses:    Food Insecurity:     Worried About Running Out of Food in the Last Year:     [address] in the Last Year:    Transportation Needs:     Lack of Transportation (Medical):      Lack of Transportation (Non-Medical):    Physical Activity:     Days of Exercise per Week:     Minutes of Exercise per Session:    Stress:     Feeling of Stress : Social Connections:     Frequency of Communication with Friends and Family:     Frequency of Social Gatherings with Friends and Family:     Attends [employer] Services:     Active Member of Clubs or Organizations:     Attends Club or Organization Meetings:     Marital Status:    Intimate Partner Violence:     Fear of Current or Ex-Partner:     Emotionally Abused:     Physically Abused:     Sexually Abused: ALLERGIES: Patient has no known allergies. Review of Systems   Constitutional: Negative for activity change, appetite change, fever and unexpected weight change. HENT: Negative for congestion, rhinorrhea, sore throat and trouble swallowing. Eyes: Negative for visual disturbance. Respiratory: Negative for cough and shortness of breath. Cardiovascular: Negative for chest pain, palpitations and leg swelling. Gastrointestinal: Negative for abdominal pain, diarrhea, nausea and vomiting. Genitourinary: Negative for dysuria and flank pain. Musculoskeletal: Positive for arthralgias. Skin:        Localized swelling and tenderness at the tip of the left great toenail;Skin integrity is intact. There is no obvious bony or soft tissue deformity. Good neurovascular sensation. No apparent tendon or nerve injury; concerning for early ingrown toenail. Neurological: Negative for dizziness, light-headedness and headaches. All other systems reviewed and are negative. There were no vitals filed for this visit. Physical Exam  Vitals and nursing note reviewed. Constitutional:       General: She is not in acute distress. Appearance: Normal appearance. She is obese. She is not ill-appearing, toxic-appearing or diaphoretic. Comments: Female; non smoker; works from home   [address]:      Head: Normocephalic. Cardiovascular:      Rate and Rhythm: Normal rate and regular rhythm.    Pulmonary:      Effort: Pulmonary effort is normal.   Musculoskeletal:         General: Swelling and tenderness present. No deformity. Cervical back: Normal range of motion and neck supple. Comments: Localized tenderness, swelling medial aspect of the right great toe consistent with concerns for early ingrowing toenail   Neurological:      Mental Status: She is alert. MDM       Procedures      Reviewed skin recommendations with [facility]. Follow up with foot specialist if no improvement for further evaluation. Use only unscented soaps and lotions. Please soak in warm water and baking soda as prescribed. 4:23 PM  Patient's results and plan of care have been reviewed with her. Patient has verbally conveyed her understanding and agreement of her signs, symptoms, diagnosis, treatment and prognosis and additionally agrees to follow up as recommended or return to the Emergency Room should her condition change prior to follow-up. Discharge instructions have also been provided to the patient with some educational information regarding her diagnosis as well a list of reasons why she would want to return to the ER prior to her follow-up appointment should her condition change. Humberto Sandifer, NP Ears: no ear pain and no hearing problems. Nose: no nasal congestion and no nasal drainage. Mouth/Throat: no dysphagia, no hoarseness and no throat pain. Neck: no lumps, no pain, no stiffness and no swollen glands.

## 2021-10-23 NOTE — ED TRIAGE NOTES
Pt reports ingrown left big toe nail for two months. + redness, swelling, tenderness and yellow drainage.

## 2022-03-11 ENCOUNTER — TELEPHONE (OUTPATIENT)
Dept: CARDIOLOGY CLINIC | Age: 32
End: 2022-03-11

## 2022-03-11 NOTE — TELEPHONE ENCOUNTER
Verified patient with two types of identifiers. Called patient to see if she has seen a previous cardiologist or PCP for updated records. Patient does not have a previous cardiologist or PCP. Reminded patient of appointment next week. Patient verbalized understanding and will call with any other questions.       Future Appointments   Date Time Provider Cee Agrawal   3/15/2022  9:00 AM MD ROBSON Salter AMB

## 2022-03-15 ENCOUNTER — OFFICE VISIT (OUTPATIENT)
Dept: CARDIOLOGY CLINIC | Age: 32
End: 2022-03-15
Payer: COMMERCIAL

## 2022-03-15 VITALS
HEART RATE: 78 BPM | SYSTOLIC BLOOD PRESSURE: 136 MMHG | HEIGHT: 65 IN | WEIGHT: 293 LBS | BODY MASS INDEX: 48.82 KG/M2 | DIASTOLIC BLOOD PRESSURE: 90 MMHG

## 2022-03-15 DIAGNOSIS — R03.0 ELEVATED BP WITHOUT DIAGNOSIS OF HYPERTENSION: ICD-10-CM

## 2022-03-15 DIAGNOSIS — R06.02 SOB (SHORTNESS OF BREATH) ON EXERTION: ICD-10-CM

## 2022-03-15 DIAGNOSIS — Z01.810 PREOPERATIVE CARDIOVASCULAR EXAMINATION: Primary | ICD-10-CM

## 2022-03-15 DIAGNOSIS — Z01.810 PREOPERATIVE CARDIOVASCULAR EXAMINATION: ICD-10-CM

## 2022-03-15 DIAGNOSIS — E66.01 MORBID OBESITY DUE TO EXCESS CALORIES (HCC): ICD-10-CM

## 2022-03-15 PROCEDURE — 99204 OFFICE O/P NEW MOD 45 MIN: CPT | Performed by: INTERNAL MEDICINE

## 2022-03-15 PROCEDURE — 93000 ELECTROCARDIOGRAM COMPLETE: CPT | Performed by: INTERNAL MEDICINE

## 2022-03-15 RX ORDER — METOPROLOL SUCCINATE 25 MG/1
25 TABLET, EXTENDED RELEASE ORAL DAILY
Qty: 30 TABLET | Refills: 1 | Status: SHIPPED | OUTPATIENT
Start: 2022-03-15 | End: 2022-03-16

## 2022-03-15 RX ORDER — PHENTERMINE HYDROCHLORIDE 30 MG/1
CAPSULE ORAL
COMMUNITY
Start: 2022-02-16

## 2022-03-15 RX ORDER — MEDROXYPROGESTERONE ACETATE 10 MG/1
TABLET ORAL
COMMUNITY
Start: 2022-02-25

## 2022-03-15 NOTE — PROGRESS NOTES
Cardiac Electrophysiology OFFICE Consultation Note     Subjective:       Carrie Smith is a 28 y. o.patient who presents for preoperative cardiac risk stratification prior to upcoming planned gastric sleeve surgery (scheduled with Dr. Constantine Sandifer on 04/25/2022). She denies chest pain, palpitations, lightheadedness, PND, or orthopnea. Exercises 3 times per week at the gym, walks on inclined treadmill.  States she becomes a bit winded after 5 minutes, but is able to continue. ZAMORA mild exertion. ECG today shows NSR 78 bpm without ST/T abnormalities. BP mildly elevated, but states it's usually well controlled at PCP & bariatric visits. Previous:   COVID infection in 2021. Tobacco abuse. No family history of cardiac problems. Problem List    Pilonidal cyst with abscess ICD-10-CM: L05.01   ICD-9-CM: 685.0 8/31/2017   Overview Signed 8/31/2017  4:46 PM by Danisha Manzo MD     Recurrent       Current Outpatient Medications   Medication Sig Dispense Refill   · phentermine 30 mg capsule TAKE 1 CAPSULE BY MOUTH ONCE DAILY IN THE MORNING   · medroxyPROGESTERone (PROVERA) 10 mg tablet TAKE 1 TABLET BY MOUTH TWO TIMES DAILY FOR 90 DAYS   · albuterol (PROVENTIL HFA, VENTOLIN HFA, PROAIR HFA) 90 mcg/actuation inhaler Take 2 Puffs by inhalation every six (6) hours as needed for Wheezing. · mupirocin calcium (Bactroban) 2 % topical cream Apply  to affected area two (2) times a day. (Patient not taking: Reported on 3/15/2022) 15 g 0   · mupirocin calcium (Bactroban) 2 % topical cream Apply  to affected area two (2) times a day. 15 g 0     No Known Allergies   Past Medical History:   Diagnosis Date   · Asthma   · Hydradenitis   · Sleep apnea     Past Surgical History:   Procedure Laterality Date   · HX OTHER SURGICAL 2007-08?    cyst lanced     Family History   Problem Relation Age of Onset   · Hypertension Mother   · Hypertension Father   · Diabetes Father   · Cancer Father      myeloma? Social History     Tobacco Use   · Smoking status: Current Every Day Smoker   Packs/day: 0.50   Years: 10.00   Pack years: 5.00   · Smokeless tobacco: Current User   Substance Use Topics   · Alcohol use: Yes   Comment: weekends         Review of Systems: Review of all other systems otherwise negative. Constitutional: Negative for fever, chills, weight loss, malaise/fatigue. HEENT: Negative for nosebleeds, vision changes. Respiratory: Negative for cough, hemoptysis   Cardiovascular: Negative for chest pain, palpitations, orthopnea, claudication, leg swelling, syncope, and PND. Gastrointestinal: Negative for nausea, vomiting, diarrhea, blood in stool and melena. Genitourinary: Negative for dysuria, and hematuria. Musculoskeletal: Negative for myalgias, arthralgia. Skin: Negative for rash. Heme: Does not bleed or bruise easily. Neurological: Negative for speech change and focal weakness.       Objective:   Visit Vitals  BP (!) 136/90   Pulse 78   Ht 5' 5\" (1.651 m)   Wt 348 lb (157.9 kg)   BMI 57.91 kg/m²         Physical Exam:   Constitutional: Well-developed and well-nourished. No respiratory distress. Head: Normocephalic and atraumatic. Eyes: Pupils are equal, round. ENT: Hearing grossly normal.   Neck: Supple. No JVD present. Cardiovascular: Normal rate, regular rhythm. Exam reveals no gallop and no friction rub. No murmur heard. Pulmonary/Chest: Effort normal and breath sounds normal. No wheezes. Abdominal: Soft, no tenderness. Morbidly Obese. Musculoskeletal: Moves extremities independently.  Normal gait. Vasc/lymphatic: No edema. Neurological: Alert,oriented. Skin: Skin is warm and dry. Psychiatric: Normal mood and affect. Behavior is normal. Judgment and thought content normal.       EKG: NSR 78 bpm.  No significant ST/T abnormalities. Assessment/Plan:       ICD-10-CM ICD-9-CM    1.  Preoperative cardiovascular examination  Z01.810 V72.81 ECHO ADULT COMPLETE      metoprolol succinate (TOPROL-XL) 25 mg XL tablet      LIPID PANEL      HEMOGLOBIN A1C WITH EAG      METABOLIC PANEL, COMPREHENSIVE   2. SOB (shortness of breath) on exertion  R06.02 786.05 AMB POC EKG ROUTINE W/ 12 LEADS, INTER & REP      ECHO ADULT COMPLETE      metoprolol succinate (TOPROL-XL) 25 mg XL tablet      LIPID PANEL      HEMOGLOBIN A1C WITH EAG      METABOLIC PANEL, COMPREHENSIVE   3. Elevated BP without diagnosis of hypertension  R03.0 796.2 ECHO ADULT COMPLETE      metoprolol succinate (TOPROL-XL) 25 mg XL tablet      LIPID PANEL      HEMOGLOBIN A1C WITH EAG      METABOLIC PANEL, COMPREHENSIVE   4. Morbid obesity due to excess calories (HCC)  E66.01 278.01          Elevated BP: States well controlled at home, not on any antihypertensives, no known history of HTN.  Will start Toprol XL 25 mg po daily     Preoperative cardiac risk stratification: surgery type is moderate risk.  ECG today shows NSR 78 bpm, no significant ST/T abnormalities. Mild ZAMORA could be from morbid obesity  Not likely CAD and ischemia  Check 2D echo to rule out structural abnormality or cardiomyopathy.    Will check preop labs and risk factors for CAD: CMP, lipid panel & Hgb A1c as well to assess risk factors further.  Once testing has resulted, will determine patient risk & send to Dr. Amrik Olivia in [address]. Need to stop smoking     Follow up PRN based on labs and echo, post op        Addendum  Echo 3/2022 moderate concentric LVH, no valvular disease, normal ventricular function   Labs:   CMP ok, normal range  No DM2  Lipid with mild LDL and total cholesterol elevation but with bariatric surgery and weight loss, will be improved  Continue with weight loss and diet change before medication    Thank you for involving me in this patient's care and please call with further concerns or questions. Waqar Small M.D.    Electrophysiology/Cardiology   [facility] and [address] [place] [address] [address] Brower [address]                     [address] [facility] [place] Margarita Roberson, [address] [place] [address]   [phone]

## 2022-03-16 LAB
ALBUMIN SERPL-MCNC: 3.4 G/DL (ref 3.5–5)
ALBUMIN/GLOB SERPL: 0.8 {RATIO} (ref 1.1–2.2)
ALP SERPL-CCNC: 60 U/L (ref 45–117)
ALT SERPL-CCNC: 39 U/L (ref 12–78)
ANION GAP SERPL CALC-SCNC: 6 MMOL/L (ref 5–15)
AST SERPL-CCNC: 17 U/L (ref 15–37)
BILIRUB SERPL-MCNC: 0.3 MG/DL (ref 0.2–1)
BUN SERPL-MCNC: 14 MG/DL (ref 6–20)
BUN/CREAT SERPL: 14 (ref 12–20)
CALCIUM SERPL-MCNC: 9 MG/DL (ref 8.5–10.1)
CHLORIDE SERPL-SCNC: 110 MMOL/L (ref 97–108)
CHOLEST SERPL-MCNC: 209 MG/DL
CO2 SERPL-SCNC: 21 MMOL/L (ref 21–32)
CREAT SERPL-MCNC: 1 MG/DL (ref 0.55–1.02)
EST. AVERAGE GLUCOSE BLD GHB EST-MCNC: 111 MG/DL
GLOBULIN SER CALC-MCNC: 4.4 G/DL (ref 2–4)
GLUCOSE SERPL-MCNC: 95 MG/DL (ref 65–100)
HBA1C MFR BLD: 5.5 % (ref 4–5.6)
HDLC SERPL-MCNC: 41 MG/DL
HDLC SERPL: 5.1 {RATIO} (ref 0–5)
LDLC SERPL CALC-MCNC: 139.6 MG/DL (ref 0–100)
POTASSIUM SERPL-SCNC: 4.4 MMOL/L (ref 3.5–5.1)
PROT SERPL-MCNC: 7.8 G/DL (ref 6.4–8.2)
SODIUM SERPL-SCNC: 137 MMOL/L (ref 136–145)
TRIGL SERPL-MCNC: 142 MG/DL (ref ?–150)
VLDLC SERPL CALC-MCNC: 28.4 MG/DL

## 2022-03-17 ENCOUNTER — TELEPHONE (OUTPATIENT)
Dept: CARDIOLOGY CLINIC | Age: 32
End: 2022-03-17

## 2022-03-17 NOTE — TELEPHONE ENCOUNTER
Verified patient with two types of identifiers. Notified of results and MD recommendations. Patient verbalized understanding and will call with any other questions.       Future Appointments   Date Time Provider Cee Agrawal   3/22/2022  9:00 AM RODRIGUEZ HANNON AMB

## 2022-03-17 NOTE — PROGRESS NOTES
CMP ok, normal range  No DM2  Lipid with mild LDL and total cholesterol elevation but with bariatric surgery and weight loss, will be improved  Continue with weight loss and diet change before medication

## 2022-03-17 NOTE — TELEPHONE ENCOUNTER
----- Message from Lars De La Fuente MD sent at 3/17/2022 12:15 AM EDT -----  CMP ok, normal range  No DM2  Lipid with mild LDL and total cholesterol elevation but with bariatric surgery and weight loss, will be improved  Continue with weight loss and diet change before medication

## 2022-03-18 PROBLEM — L05.01 PILONIDAL CYST WITH ABSCESS: Status: ACTIVE | Noted: 2017-08-31

## 2022-03-22 ENCOUNTER — ANCILLARY PROCEDURE (OUTPATIENT)
Dept: CARDIOLOGY CLINIC | Age: 32
End: 2022-03-22
Payer: COMMERCIAL

## 2022-03-22 VITALS
WEIGHT: 293 LBS | HEIGHT: 65 IN | BODY MASS INDEX: 48.82 KG/M2 | SYSTOLIC BLOOD PRESSURE: 136 MMHG | DIASTOLIC BLOOD PRESSURE: 90 MMHG

## 2022-03-22 DIAGNOSIS — Z01.810 PREOPERATIVE CARDIOVASCULAR EXAMINATION: ICD-10-CM

## 2022-03-22 DIAGNOSIS — R03.0 ELEVATED BP WITHOUT DIAGNOSIS OF HYPERTENSION: ICD-10-CM

## 2022-03-22 DIAGNOSIS — R06.02 SOB (SHORTNESS OF BREATH) ON EXERTION: ICD-10-CM

## 2022-03-22 PROCEDURE — 93306 TTE W/DOPPLER COMPLETE: CPT | Performed by: INTERNAL MEDICINE

## 2022-03-26 LAB
ECHO AO ASC DIAM: 3.1 CM
ECHO AO ASCENDING AORTA INDEX: 1.24 CM/M2
ECHO AO ROOT DIAM: 3.1 CM
ECHO AO ROOT INDEX: 1.24 CM/M2
ECHO AV PEAK GRADIENT: 9 MMHG
ECHO AV PEAK VELOCITY: 1.5 M/S
ECHO AV VELOCITY RATIO: 0.73
ECHO LA DIAMETER INDEX: 1.6 CM/M2
ECHO LA DIAMETER: 4 CM
ECHO LA TO AORTIC ROOT RATIO: 1.29
ECHO LA VOL 2C: 32 ML (ref 22–52)
ECHO LA VOL 4C: 37 ML (ref 22–52)
ECHO LA VOL BP: 35 ML (ref 22–52)
ECHO LA VOL/BSA BIPLANE: 14 ML/M2 (ref 16–34)
ECHO LA VOLUME AREA LENGTH: 37 ML
ECHO LA VOLUME INDEX A2C: 13 ML/M2 (ref 16–34)
ECHO LA VOLUME INDEX A4C: 15 ML/M2 (ref 16–34)
ECHO LA VOLUME INDEX AREA LENGTH: 15 ML/M2 (ref 16–34)
ECHO LV E' LATERAL VELOCITY: 15 CM/S
ECHO LV E' SEPTAL VELOCITY: 13 CM/S
ECHO LV FRACTIONAL SHORTENING: 33 % (ref 28–44)
ECHO LV INTERNAL DIMENSION DIASTOLE INDEX: 1.6 CM/M2
ECHO LV INTERNAL DIMENSION DIASTOLIC: 4 CM (ref 3.9–5.3)
ECHO LV INTERNAL DIMENSION SYSTOLIC INDEX: 1.08 CM/M2
ECHO LV INTERNAL DIMENSION SYSTOLIC: 2.7 CM
ECHO LV ISOVOLUMETRIC RELAXATION TIME (IVRT): 70.4 MS
ECHO LV IVSD: 1.5 CM (ref 0.6–0.9)
ECHO LV MASS 2D: 232.7 G (ref 67–162)
ECHO LV MASS INDEX 2D: 93.1 G/M2 (ref 43–95)
ECHO LV POSTERIOR WALL DIASTOLIC: 1.5 CM (ref 0.6–0.9)
ECHO LV RELATIVE WALL THICKNESS RATIO: 0.75
ECHO LVOT PEAK GRADIENT: 5 MMHG
ECHO LVOT PEAK VELOCITY: 1.1 M/S
ECHO MV "A" WAVE DURATION: 140.8 MSEC
ECHO MV A VELOCITY: 0.66 M/S
ECHO MV AREA PHT: 3.7 CM2
ECHO MV E DECELERATION TIME (DT): 207.1 MS
ECHO MV E VELOCITY: 0.75 M/S
ECHO MV E/A RATIO: 1.14
ECHO MV E/E' LATERAL: 5
ECHO MV E/E' RATIO (AVERAGED): 5.38
ECHO MV E/E' SEPTAL: 5.77
ECHO MV PRESSURE HALF TIME (PHT): 60.1 MS
ECHO PVEIN A DURATION: 116.1 MS
ECHO PVEIN A VELOCITY: 0.2 M/S
ECHO RV TAPSE: 2.4 CM (ref 1.5–2)

## 2022-03-26 NOTE — PROGRESS NOTES
moderate concentric LVH (need to focus on control hypertension), no valvular disease, normal ventricular function

## 2022-03-28 ENCOUNTER — TELEPHONE (OUTPATIENT)
Dept: CARDIOLOGY CLINIC | Age: 32
End: 2022-03-28

## 2022-03-28 NOTE — TELEPHONE ENCOUNTER
Verified patient with two types of identifiers. Notified of results and MD recommendations. Patient verbalized understanding and will call with any other questions.

## 2022-03-28 NOTE — TELEPHONE ENCOUNTER
----- Message from Macy Bridges MD sent at 3/26/2022  2:58 PM EDT -----  moderate concentric LVH (need to focus on control hypertension), no valvular disease, normal ventricular function

## 2022-04-20 ENCOUNTER — TELEPHONE (OUTPATIENT)
Dept: CARDIOLOGY CLINIC | Age: 32
End: 2022-04-20

## 2022-04-20 NOTE — TELEPHONE ENCOUNTER
Electronically faxed Dr. Christine Vallejo last office note to [facility] at fax number [phone]. Fax confirmation received.

## 2022-04-20 NOTE — TELEPHONE ENCOUNTER
Zandra Mccabe from [facility] calling for an updated cardiac clearance for patient. Has an upcoming surgery.  Please fax to [phone]

## 2022-09-18 DIAGNOSIS — R03.0 ELEVATED BP WITHOUT DIAGNOSIS OF HYPERTENSION: ICD-10-CM

## 2022-09-18 DIAGNOSIS — Z01.810 PREOPERATIVE CARDIOVASCULAR EXAMINATION: ICD-10-CM

## 2022-09-18 DIAGNOSIS — R06.02 SOB (SHORTNESS OF BREATH) ON EXERTION: ICD-10-CM

## 2022-09-19 RX ORDER — METOPROLOL SUCCINATE 25 MG/1
25 TABLET, EXTENDED RELEASE ORAL DAILY
Qty: 90 TABLET | Refills: 1 | Status: SHIPPED | OUTPATIENT
Start: 2022-09-19

## 2022-09-19 NOTE — TELEPHONE ENCOUNTER
Requested Prescriptions     Signed Prescriptions Disp Refills    metoprolol succinate (TOPROL-XL) 25 mg XL tablet 90 Tablet 1     Sig: TAKE 1 TABLET BY MOUTH DAILY     Authorizing Provider: David Taveras     Ordering User: Imer Mcgee     Refills per verbal order from TYRELL Posada NP.    Last visit: 3/15/22  Next visit: to be scheduled

## 2022-10-01 ENCOUNTER — HOSPITAL ENCOUNTER (EMERGENCY)
Age: 32
Discharge: LEFT BEFORE DISCHARGE | End: 2022-10-01
Payer: COMMERCIAL

## 2022-10-01 PROCEDURE — 75810000275 HC EMERGENCY DEPT VISIT NO LEVEL OF CARE

## 2023-04-17 ENCOUNTER — HOSPITAL ENCOUNTER (EMERGENCY)
Age: 33
Discharge: HOME | End: 2023-04-17
Attending: EMERGENCY MEDICINE
Payer: COMMERCIAL

## 2023-04-17 VITALS
SYSTOLIC BLOOD PRESSURE: 141 MMHG | WEIGHT: 227.51 LBS | DIASTOLIC BLOOD PRESSURE: 94 MMHG | BODY MASS INDEX: 37.86 KG/M2 | RESPIRATION RATE: 16 BRPM | HEART RATE: 85 BPM | OXYGEN SATURATION: 99 % | TEMPERATURE: 98.9 F

## 2023-04-17 DIAGNOSIS — L02.91 ABSCESS: Primary | ICD-10-CM

## 2023-04-17 PROCEDURE — 74011000250 HC RX REV CODE- 250

## 2023-04-17 PROCEDURE — 75810000289 HC I&D ABSCESS SIMP/COMP/MULT

## 2023-04-17 PROCEDURE — 99283 EMERGENCY DEPT VISIT LOW MDM: CPT

## 2023-04-17 RX ORDER — DOXYCYCLINE HYCLATE 100 MG
100 TABLET ORAL 2 TIMES DAILY
Qty: 14 TABLET | Refills: 0 | Status: SHIPPED | OUTPATIENT
Start: 2023-04-17 | End: 2023-04-24

## 2023-04-17 RX ORDER — LIDOCAINE HYDROCHLORIDE AND EPINEPHRINE 10; 10 MG/ML; UG/ML
4.5 INJECTION, SOLUTION INFILTRATION; PERINEURAL
Status: COMPLETED | OUTPATIENT
Start: 2023-04-17 | End: 2023-04-17

## 2023-04-17 RX ORDER — CHLORHEXIDINE GLUCONATE 4 G/100ML
SOLUTION TOPICAL
Qty: 118 ML | Refills: 0 | Status: SHIPPED | OUTPATIENT
Start: 2023-04-17

## 2023-04-17 RX ADMIN — Medication 3 ML: at 14:52

## 2023-04-17 RX ADMIN — LIDOCAINE HYDROCHLORIDE,EPINEPHRINE BITARTRATE 45 MG: 10; .01 INJECTION, SOLUTION INFILTRATION; PERINEURAL at 14:53

## 2023-04-17 NOTE — DISCHARGE INSTRUCTIONS
You are being prescribed doxycycline as an antibiotic and Hibiclens soap. Follow-up with dermatology and general surgery for further management. Alternate Tylenol Motrin for pain. Return to ER if you experience severe or worsening symptoms.

## 2023-04-17 NOTE — ED NOTES
Discharge instructions reviewed with pt and copy given along with RX by this RN. Patient educated on follow up with PCP as well as Dermatology & General Surgery. Patient is ambulatory from ED in no sign of distress or discomfort with family member providing transport home. Pt verbalized understanding of all education prior to leaving ED.

## 2023-04-21 DIAGNOSIS — R06.02 SOB (SHORTNESS OF BREATH) ON EXERTION: ICD-10-CM

## 2023-04-21 DIAGNOSIS — Z01.810 PREOPERATIVE CARDIOVASCULAR EXAMINATION: ICD-10-CM

## 2023-04-21 DIAGNOSIS — R03.0 ELEVATED BP WITHOUT DIAGNOSIS OF HYPERTENSION: ICD-10-CM

## 2023-04-21 RX ORDER — METOPROLOL SUCCINATE 25 MG/1
25 TABLET, EXTENDED RELEASE ORAL DAILY
Qty: 90 TABLET | Refills: 1 | Status: SHIPPED | OUTPATIENT
Start: 2023-04-21

## 2023-04-24 ENCOUNTER — HOSPITAL ENCOUNTER (EMERGENCY)
Age: 33
Discharge: HOME | End: 2023-04-24
Attending: EMERGENCY MEDICINE
Payer: COMMERCIAL

## 2023-04-24 VITALS
SYSTOLIC BLOOD PRESSURE: 130 MMHG | BODY MASS INDEX: 36.88 KG/M2 | WEIGHT: 229.5 LBS | OXYGEN SATURATION: 90 % | DIASTOLIC BLOOD PRESSURE: 89 MMHG | RESPIRATION RATE: 18 BRPM | TEMPERATURE: 98.4 F | HEART RATE: 71 BPM | HEIGHT: 66 IN

## 2023-04-24 DIAGNOSIS — M79.671 RIGHT FOOT PAIN: Primary | ICD-10-CM

## 2023-04-24 PROCEDURE — 74011636637 HC RX REV CODE- 636/637: Performed by: EMERGENCY MEDICINE

## 2023-04-24 PROCEDURE — 99283 EMERGENCY DEPT VISIT LOW MDM: CPT

## 2023-04-24 PROCEDURE — 74011250637 HC RX REV CODE- 250/637: Performed by: EMERGENCY MEDICINE

## 2023-04-24 RX ORDER — PREDNISONE 20 MG/1
80 TABLET ORAL
Status: COMPLETED | OUTPATIENT
Start: 2023-04-24 | End: 2023-04-24

## 2023-04-24 RX ORDER — HYDROCODONE BITARTRATE AND ACETAMINOPHEN 5; 325 MG/1; MG/1
1 TABLET ORAL
Qty: 8 TABLET | Refills: 0 | Status: SHIPPED | OUTPATIENT
Start: 2023-04-24 | End: 2023-04-26

## 2023-04-24 RX ORDER — PREDNISONE 20 MG/1
40 TABLET ORAL DAILY
Qty: 8 TABLET | Refills: 0 | Status: SHIPPED | OUTPATIENT
Start: 2023-04-24 | End: 2023-04-28

## 2023-04-24 RX ORDER — HYDROCODONE BITARTRATE AND ACETAMINOPHEN 5; 325 MG/1; MG/1
1 TABLET ORAL
Status: COMPLETED | OUTPATIENT
Start: 2023-04-24 | End: 2023-04-24

## 2023-04-24 RX ADMIN — HYDROCODONE BITARTRATE AND ACETAMINOPHEN 1 TABLET: 5; 325 TABLET ORAL at 04:40

## 2023-04-24 RX ADMIN — PREDNISONE 80 MG: 20 TABLET ORAL at 04:40

## 2023-04-24 NOTE — ED TRIAGE NOTES
Emergency Room Nursing Note        Patient Name: Adal Wolff      : 1990             MRN: [id number]      Chief Complaint:  Foot Pain      Admit Diagnosis: No admission diagnoses are documented for this encounter. Admitting Provider: No admitting provider for patient encounter. Surgery: * No surgery found *           Patient arrived to the ER via EMS from home with complaints of Right Foot Pain that started 3 days ago. Per patient she had an I&D on the Right Armpit and was put on Doxycycline. Pt states she took some Tylenol at 6 pm last night for the Foot Pain. Pt reports no Trauma, pt able to wiggle digits, has sensation & has palpable pulse on the affected extremity.          Lines:        Signed by: Tima John RN, JUAN ALBERTO, BSN, [facility]                                              2023 at 4:03 AM

## 2023-04-24 NOTE — DISCHARGE INSTRUCTIONS
You were seen in the emergency department for foot pain. Although an exact cause of your symptoms was not identified, the most likely cause is gout. Please take any medications prescribed at this visit as instructed. Please follow-up with your PCP or return to the emergency department if you experience a worsening of symptoms or any new symptoms that are concerning to you.

## 2023-04-24 NOTE — ROUTINE PROCESS
Emergency Room Nursing Note        Patient Name: Altha Duane      : 1990             MRN: [id number]      Chief Complaint: Foot Pain      Admit Diagnosis: No admission diagnoses are documented for this encounter. Surgery: * No surgery found *            MD/RN reviewed discharge instructions and options with patient. Patient verbalized understanding. RN reviewed discharge instructions using teach back method. Patient ambulatory to exit without difficulty and no acute signs of distress. No complaints or needs expressed at this time. Patient counseled on medications prescribed at discharge (If prescribed). Vital signs stable. Patient to follow up with PCP/Specialist on the next business day for appointment. All questions answered by ER RN.           Lines:        Vitals: Patient Vitals for the past 12 hrs:   Temp Pulse Resp BP SpO2   23 0500 98.4 °F (36.9 °C) 71 18 130/89 90 %   23 0445 -- -- -- (!) 134/98 100 %   23 0415 -- -- -- (!) 138/103 99 %   23 0400 98.6 °F (37 °C) 66 18 (!) 132/96 99 %         Signed by: Kari Troy RN, JUAN ALBERTO, BSN, [facility]                                              2023 at 5:18 AM

## 2023-04-24 NOTE — ED PROVIDER NOTES
36-year-old female with PMHx of asthma, hidradenitis on doxycycline since last week, family history of gout presents to the emergency department complaining of progressively worsening right foot pain, swelling, and redness x2 days. Patient denies any injury to the foot or ankle. She denies any history of similar symptoms. She has no additional complaints this time. The history is provided by the patient. Foot Pain        Past Medical History:   Diagnosis Date    Asthma     Hydradenitis     Sleep apnea        Past Surgical History:   Procedure Laterality Date    HX OTHER SURGICAL  2007-08? cyst lanced         Family History:   Problem Relation Age of Onset    Hypertension Mother     Hypertension Father     Diabetes Father     Cancer Father         myeloma? Social History     Socioeconomic History    Marital status: SINGLE     Spouse name: Not on file    Number of children: Not on file    Years of education: Not on file    Highest education level: Not on file   Occupational History    Not on file   Tobacco Use    Smoking status: Former     Packs/day: 0.50     Years: 10.00     Pack years: 5.00     Types: Cigarettes    Smokeless tobacco: Current   Substance and Sexual Activity    Alcohol use: Yes     Alcohol/week: 28.0 standard drinks     Types: 28 Drinks containing 0.5 oz of alcohol per week    Drug use: Never    Sexual activity: Not on file   Other Topics Concern    Not on file   Social History Narrative    Not on file     Social Determinants of Health     Financial Resource Strain: Not on file   Food Insecurity: Not on file   Transportation Needs: Not on file   Physical Activity: Not on file   Stress: Not on file   Social Connections: Not on file   Intimate Partner Violence: Not on file   Housing Stability: Not on file         ALLERGIES: Patient has no known allergies. Review of Systems   Constitutional: Negative. HENT: Negative. Eyes: Negative. Respiratory: Negative.      Cardiovascular: Negative. Gastrointestinal: Negative. Endocrine: Negative. Genitourinary: Negative. Musculoskeletal:  Positive for arthralgias. Skin: Negative. Neurological: Negative. Hematological: Negative. Psychiatric/Behavioral: Negative. Vitals:    04/24/23 0400   BP: (!) 132/96   Pulse: 66   Resp: 18   Temp: 98.6 °F (37 °C)   SpO2: 99%   Weight: 104.1 kg (229 lb 8 oz)   Height: 5' 6\" (1.676 m)            Physical Exam  Vitals and nursing note reviewed. Constitutional:       General: She is not in acute distress. Appearance: Normal appearance. She is not ill-appearing, toxic-appearing or diaphoretic. HENT:      Head: Normocephalic and atraumatic. Nose: Nose normal.      Mouth/Throat:      Mouth: Mucous membranes are moist.   Cardiovascular:      Rate and Rhythm: Normal rate. Pulmonary:      Effort: Pulmonary effort is normal. No respiratory distress. Musculoskeletal:         General: Swelling and tenderness present. Cervical back: Normal range of motion. Comments: Erythema, edema, tenderness, warmth present over the dorsum of the right foot and distal right ankle. 2+ pulses in the foot   Skin:     General: Skin is warm and dry. Neurological:      General: No focal deficit present. Mental Status: She is alert and oriented to person, place, and time. Psychiatric:         Mood and Affect: Mood normal.        Medical Decision Making  DDx: Gout, cellulitis, septic joint    Given the patient is currently taking Doxy, and with normal vital signs, the likelihood of infection is lower than the likelihood of gout. Had a discussion with her about the benefit of doing a joint aspiration, but the patient stated that she would prefer to be treated for presumed gout and if no improvement in 48 hours return to the emergency department for further diagnostic work-up.     Plan:  - Medications: prednisone, norco  - Discharge with prescriptions for the same    Risk  Prescription drug management.            Procedures

## 2024-01-16 RX ORDER — METOPROLOL SUCCINATE 25 MG/1
25 TABLET, EXTENDED RELEASE ORAL DAILY
Qty: 90 TABLET | OUTPATIENT
Start: 2024-01-16

## 2024-02-26 RX ORDER — METOPROLOL SUCCINATE 25 MG/1
25 TABLET, EXTENDED RELEASE ORAL DAILY
Qty: 90 TABLET | OUTPATIENT
Start: 2024-02-26

## 2024-12-16 ENCOUNTER — HOSPITAL ENCOUNTER (EMERGENCY)
Facility: HOSPITAL | Age: 34
Discharge: HOME | End: 2024-12-16
Attending: EMERGENCY MEDICINE
Payer: COMMERCIAL

## 2024-12-16 VITALS
DIASTOLIC BLOOD PRESSURE: 98 MMHG | SYSTOLIC BLOOD PRESSURE: 142 MMHG | BODY MASS INDEX: 28.06 KG/M2 | HEIGHT: 66 IN | RESPIRATION RATE: 12 BRPM | HEART RATE: 102 BPM | WEIGHT: 174.6 LBS | TEMPERATURE: 98.9 F | OXYGEN SATURATION: 98 %

## 2024-12-16 DIAGNOSIS — E86.0 DEHYDRATION: ICD-10-CM

## 2024-12-16 DIAGNOSIS — E83.51 HYPOCALCEMIA: ICD-10-CM

## 2024-12-16 DIAGNOSIS — E87.6 HYPOKALEMIA: Primary | ICD-10-CM

## 2024-12-16 DIAGNOSIS — E83.42 HYPOMAGNESEMIA: ICD-10-CM

## 2024-12-16 LAB
ALBUMIN SERPL-MCNC: 2.6 G/DL (ref 3.5–5)
ALBUMIN/GLOB SERPL: 0.4 (ref 1.1–2.2)
ALP SERPL-CCNC: 168 U/L (ref 45–117)
ALT SERPL-CCNC: 44 U/L (ref 12–78)
ANION GAP SERPL CALC-SCNC: 10 MMOL/L (ref 2–12)
AST SERPL-CCNC: 56 U/L (ref 15–37)
BASOPHILS # BLD: 0.1 K/UL (ref 0–0.1)
BASOPHILS NFR BLD: 1 % (ref 0–1)
BILIRUB SERPL-MCNC: 0.6 MG/DL (ref 0.2–1)
BUN SERPL-MCNC: 7 MG/DL (ref 6–20)
BUN/CREAT SERPL: 7 (ref 12–20)
CA-I BLD-SCNC: 0.95 MMOL/L (ref 1.12–1.32)
CALCIUM SERPL-MCNC: 9.5 MG/DL (ref 8.5–10.1)
CHLORIDE SERPL-SCNC: 94 MMOL/L (ref 97–108)
CO2 SERPL-SCNC: 33 MMOL/L (ref 21–32)
COMMENT:: NORMAL
CREAT SERPL-MCNC: 0.97 MG/DL (ref 0.55–1.02)
DIFFERENTIAL METHOD BLD: ABNORMAL
EKG ATRIAL RATE: 109 BPM
EKG ATRIAL RATE: 99 BPM
EKG DIAGNOSIS: NORMAL
EKG DIAGNOSIS: NORMAL
EKG P AXIS: 59 DEGREES
EKG P AXIS: 59 DEGREES
EKG P-R INTERVAL: 136 MS
EKG P-R INTERVAL: 142 MS
EKG Q-T INTERVAL: 368 MS
EKG Q-T INTERVAL: 388 MS
EKG QRS DURATION: 78 MS
EKG QRS DURATION: 86 MS
EKG QTC CALCULATION (BAZETT): 495 MS
EKG QTC CALCULATION (BAZETT): 497 MS
EKG R AXIS: -18 DEGREES
EKG R AXIS: -21 DEGREES
EKG T AXIS: 25 DEGREES
EKG T AXIS: 47 DEGREES
EKG VENTRICULAR RATE: 109 BPM
EKG VENTRICULAR RATE: 99 BPM
EOSINOPHIL # BLD: 0 K/UL (ref 0–0.4)
EOSINOPHIL NFR BLD: 0 % (ref 0–7)
ERYTHROCYTE [DISTWIDTH] IN BLOOD BY AUTOMATED COUNT: 13.9 % (ref 11.5–14.5)
FLUAV RNA SPEC QL NAA+PROBE: NOT DETECTED
FLUBV RNA SPEC QL NAA+PROBE: NOT DETECTED
GLOBULIN SER CALC-MCNC: 6.8 G/DL (ref 2–4)
GLUCOSE SERPL-MCNC: 95 MG/DL (ref 65–100)
HCG UR QL: NEGATIVE
HCT VFR BLD AUTO: 47.5 % (ref 35–47)
HGB BLD-MCNC: 16.4 G/DL (ref 11.5–16)
IMM GRANULOCYTES # BLD AUTO: 0 K/UL (ref 0–0.04)
IMM GRANULOCYTES NFR BLD AUTO: 0 % (ref 0–0.5)
LYMPHOCYTES # BLD: 1.2 K/UL (ref 0.8–3.5)
LYMPHOCYTES NFR BLD: 14 % (ref 12–49)
MAGNESIUM SERPL-MCNC: 1.5 MG/DL (ref 1.6–2.4)
MCH RBC QN AUTO: 34.7 PG (ref 26–34)
MCHC RBC AUTO-ENTMCNC: 34.5 G/DL (ref 30–36.5)
MCV RBC AUTO: 100.4 FL (ref 80–99)
MONOCYTES # BLD: 0.8 K/UL (ref 0–1)
MONOCYTES NFR BLD: 9 % (ref 5–13)
NEUTS SEG # BLD: 6.4 K/UL (ref 1.8–8)
NEUTS SEG NFR BLD: 76 % (ref 32–75)
NRBC # BLD: 0 K/UL (ref 0–0.01)
NRBC BLD-RTO: 0 PER 100 WBC
PLATELET # BLD AUTO: 284 K/UL (ref 150–400)
PMV BLD AUTO: 10.1 FL (ref 8.9–12.9)
POTASSIUM SERPL-SCNC: 3.1 MMOL/L (ref 3.5–5.1)
PROT SERPL-MCNC: 9.4 G/DL (ref 6.4–8.2)
RBC # BLD AUTO: 4.73 M/UL (ref 3.8–5.2)
SARS-COV-2 RNA RESP QL NAA+PROBE: NOT DETECTED
SODIUM SERPL-SCNC: 137 MMOL/L (ref 136–145)
SOURCE: NORMAL
SPECIMEN HOLD: NORMAL
WBC # BLD AUTO: 8.5 K/UL (ref 3.6–11)

## 2024-12-16 PROCEDURE — 6370000000 HC RX 637 (ALT 250 FOR IP): Performed by: STUDENT IN AN ORGANIZED HEALTH CARE EDUCATION/TRAINING PROGRAM

## 2024-12-16 PROCEDURE — 82330 ASSAY OF CALCIUM: CPT

## 2024-12-16 PROCEDURE — 96375 TX/PRO/DX INJ NEW DRUG ADDON: CPT

## 2024-12-16 PROCEDURE — 93005 ELECTROCARDIOGRAM TRACING: CPT | Performed by: STUDENT IN AN ORGANIZED HEALTH CARE EDUCATION/TRAINING PROGRAM

## 2024-12-16 PROCEDURE — 6360000002 HC RX W HCPCS: Performed by: STUDENT IN AN ORGANIZED HEALTH CARE EDUCATION/TRAINING PROGRAM

## 2024-12-16 PROCEDURE — 87636 SARSCOV2 & INF A&B AMP PRB: CPT

## 2024-12-16 PROCEDURE — 36415 COLL VENOUS BLD VENIPUNCTURE: CPT

## 2024-12-16 PROCEDURE — 93005 ELECTROCARDIOGRAM TRACING: CPT | Performed by: EMERGENCY MEDICINE

## 2024-12-16 PROCEDURE — 96365 THER/PROPH/DIAG IV INF INIT: CPT

## 2024-12-16 PROCEDURE — 96361 HYDRATE IV INFUSION ADD-ON: CPT

## 2024-12-16 PROCEDURE — 99284 EMERGENCY DEPT VISIT MOD MDM: CPT

## 2024-12-16 PROCEDURE — 80053 COMPREHEN METABOLIC PANEL: CPT

## 2024-12-16 PROCEDURE — 2580000003 HC RX 258: Performed by: STUDENT IN AN ORGANIZED HEALTH CARE EDUCATION/TRAINING PROGRAM

## 2024-12-16 PROCEDURE — 85025 COMPLETE CBC W/AUTO DIFF WBC: CPT

## 2024-12-16 PROCEDURE — 83735 ASSAY OF MAGNESIUM: CPT

## 2024-12-16 PROCEDURE — 81025 URINE PREGNANCY TEST: CPT

## 2024-12-16 PROCEDURE — 96366 THER/PROPH/DIAG IV INF ADDON: CPT

## 2024-12-16 RX ORDER — POTASSIUM CHLORIDE 1500 MG/1
20 TABLET, EXTENDED RELEASE ORAL DAILY
Qty: 5 TABLET | Refills: 0 | Status: SHIPPED | OUTPATIENT
Start: 2024-12-16 | End: 2024-12-21

## 2024-12-16 RX ORDER — POTASSIUM CHLORIDE 750 MG/1
40 TABLET, EXTENDED RELEASE ORAL ONCE
Status: COMPLETED | OUTPATIENT
Start: 2024-12-16 | End: 2024-12-16

## 2024-12-16 RX ORDER — MAGNESIUM SULFATE 1 G/100ML
1000 INJECTION INTRAVENOUS ONCE
Status: COMPLETED | OUTPATIENT
Start: 2024-12-16 | End: 2024-12-16

## 2024-12-16 RX ORDER — CALCIUM GLUCONATE 20 MG/ML
2000 INJECTION, SOLUTION INTRAVENOUS ONCE
Status: COMPLETED | OUTPATIENT
Start: 2024-12-16 | End: 2024-12-16

## 2024-12-16 RX ORDER — 0.9 % SODIUM CHLORIDE 0.9 %
1000 INTRAVENOUS SOLUTION INTRAVENOUS ONCE
Status: COMPLETED | OUTPATIENT
Start: 2024-12-16 | End: 2024-12-16

## 2024-12-16 RX ADMIN — POTASSIUM CHLORIDE 40 MEQ: 750 TABLET, EXTENDED RELEASE ORAL at 13:23

## 2024-12-16 RX ADMIN — SODIUM CHLORIDE 1000 ML: 9 INJECTION, SOLUTION INTRAVENOUS at 13:24

## 2024-12-16 RX ADMIN — CALCIUM GLUCONATE 2000 MG: 20 INJECTION, SOLUTION INTRAVENOUS at 13:58

## 2024-12-16 RX ADMIN — MAGNESIUM SULFATE HEPTAHYDRATE 1000 MG: 1 INJECTION, SOLUTION INTRAVENOUS at 13:27

## 2024-12-16 ASSESSMENT — PAIN SCALES - GENERAL
PAINLEVEL_OUTOF10: 2
PAINLEVEL_OUTOF10: 0

## 2024-12-16 ASSESSMENT — PAIN - FUNCTIONAL ASSESSMENT: PAIN_FUNCTIONAL_ASSESSMENT: 0-10

## 2024-12-16 ASSESSMENT — PAIN DESCRIPTION - ORIENTATION: ORIENTATION: RIGHT;LEFT

## 2024-12-16 ASSESSMENT — PAIN DESCRIPTION - DESCRIPTORS: DESCRIPTORS: TINGLING

## 2024-12-16 ASSESSMENT — PAIN DESCRIPTION - LOCATION: LOCATION: FOOT

## 2024-12-16 NOTE — DISCHARGE INSTR - COC
Continuity of Care Form    Patient Name: Branden Felix   :  1990  MRN:  [id number]    Admit date:  2024  Discharge date:  ***    Code Status Order: No Order   Advance Directives:   Advance Care Flowsheet Documentation             Admitting Physician:  No admitting provider for patient encounter.  PCP: No primary care provider on file.    Discharging Nurse: ***  Discharging Hospital Unit/Room#: SER05/05  Discharging Unit Phone Number: ***    Emergency Contact:   Extended Emergency Contact Information  Primary Emergency Contact: Daisy Felix  Home Phone: [phone]  Mobile Phone: [phone]  Relation: Other    Past Surgical History:  Past Surgical History:   Procedure Laterality Date    OTHER SURGICAL HISTORY  -?    cyst lanced       Immunization History:   Immunization History   Administered Date(s) Administered    TDaP, ADACEL (age 10y-64y), BOOSTRIX (age 10y+), IM, 0.5mL 2018, 10/23/2021       Active Problems:  Patient Active Problem List   Diagnosis Code    Pilonidal cyst with abscess L05.01       Isolation/Infection:   Isolation            No Isolation          Patient Infection Status       None to display                     Nurse Assessment:  Last Vital Signs: BP (!) 142/98   Pulse (!) 102   Temp 98.9 °F (37.2 °C) (Oral)   Resp 12   Ht 1.676 m (5' 6\")   Wt 79.2 kg (174 lb 9.7 oz)   LMP 2024   SpO2 98%   BMI 28.18 kg/m²     Last documented pain score (0-10 scale): Pain Level: 0  Last Weight:   Wt Readings from Last 1 Encounters:   24 79.2 kg (174 lb 9.7 oz)     Mental Status:  {IP PT MENTAL STATUS:}    IV Access:  { GINNY IV ACCESS:224909498}    Nursing Mobility/ADLs:  Walking   {CHP DME ADLs:292464076}  Transfer  {CHP DME ADLs:181898762}  Bathing  {CHP DME ADLs:834305625}  Dressing  {CHP DME ADLs:279872793}  Toileting  {CHP DME ADLs:280739069}  Feeding  {P DME ADLs:820693106}  Med Admin  {CHP DME ADLs:075930397}  Med Delivery   { GINNY MED

## 2024-12-16 NOTE — ED PROVIDER NOTES
[facility]  EMERGENCY DEPARTMENT ENCOUNTER      Pt Name: Branden Felix  MRN: [id number]  Birthdate 1990  Date of evaluation: 12/16/2024  Provider: Bethany Saez PA-C    CHIEF COMPLAINT       Chief Complaint   Patient presents with    Numbness    Spasms         HISTORY OF PRESENT ILLNESS    HPI  Patient is a 34 y.o. female who presents today with complaints of paresthesias, decree sensation in bilateral upper and lower extremities.  Her hands are affected to the wrist and feet, ankles, lower legs affected to the knees.  Also reports cramping in hands, feet.  She did have gastric bypass 2 years ago and has had cramping intermittently since the surgery.  No confusion, facial droop, dizziness.  No headache.  Denies any nausea or vomiting.    Nursing Notes were reviewed.      REVIEW OF SYSTEMS       Review of Systems   Musculoskeletal:         Cramping   Neurological:  Positive for numbness.       Except as noted above the remainder of the review of systems was reviewed and negative.       PAST MEDICAL HISTORY     Past Medical History:   Diagnosis Date    Asthma     Hydradenitis     Sleep apnea          SURGICAL HISTORY       Past Surgical History:   Procedure Laterality Date    OTHER SURGICAL HISTORY  2007-08?    cyst lanced         CURRENT MEDICATIONS       Previous Medications    ALBUTEROL SULFATE HFA (PROVENTIL;VENTOLIN;PROAIR) 108 (90 BASE) MCG/ACT INHALER    Inhale 2 puffs into the lungs every 6 hours as needed    MEDROXYPROGESTERONE (PROVERA) 10 MG TABLET    TAKE 1 TABLET BY MOUTH TWO TIMES DAILY FOR 90 DAYS    METOPROLOL SUCCINATE (TOPROL XL) 25 MG EXTENDED RELEASE TABLET    Take 25 mg by mouth daily    MUPIROCIN (BACTROBAN) 2 % CREAM    Apply topically 2 times daily    PHENTERMINE 30 MG CAPSULE    TAKE 1 CAPSULE BY MOUTH ONCE DAILY IN THE MORNING         ALLERGIES     Patient has no known allergies.      FAMILY HISTORY       Family History   Problem Relation Age of Onset    Hypertension Mother

## 2024-12-16 NOTE — ED TRIAGE NOTES
34 year old female pt comes to the ED via POV for a CC Numbness, muscle cramps. Pt states that for the past week that her fingers and toes have the tingling painful feeling and starting to get hard to walk. The numbness is bilateral up to the knees. Pt also has been having muscle like spasms in leg. Pt is A&Ox4.